# Patient Record
Sex: MALE | Race: WHITE | NOT HISPANIC OR LATINO
[De-identification: names, ages, dates, MRNs, and addresses within clinical notes are randomized per-mention and may not be internally consistent; named-entity substitution may affect disease eponyms.]

---

## 2022-09-08 ENCOUNTER — APPOINTMENT (OUTPATIENT)
Dept: SLEEP CENTER | Facility: HOSPITAL | Age: 32
End: 2022-09-08

## 2022-09-08 ENCOUNTER — OUTPATIENT (OUTPATIENT)
Dept: OUTPATIENT SERVICES | Facility: HOSPITAL | Age: 32
LOS: 1 days | Discharge: HOME | End: 2022-09-08

## 2022-09-08 PROBLEM — Z00.00 ENCOUNTER FOR PREVENTIVE HEALTH EXAMINATION: Status: ACTIVE | Noted: 2022-09-08

## 2022-09-08 PROCEDURE — 95810 POLYSOM 6/> YRS 4/> PARAM: CPT | Mod: 26

## 2022-09-09 DIAGNOSIS — G47.33 OBSTRUCTIVE SLEEP APNEA (ADULT) (PEDIATRIC): ICD-10-CM

## 2022-11-21 ENCOUNTER — APPOINTMENT (OUTPATIENT)
Dept: PULMONOLOGY | Facility: CLINIC | Age: 32
End: 2022-11-21

## 2022-11-21 VITALS
OXYGEN SATURATION: 98 % | DIASTOLIC BLOOD PRESSURE: 70 MMHG | HEIGHT: 72 IN | BODY MASS INDEX: 24.38 KG/M2 | WEIGHT: 180 LBS | HEART RATE: 80 BPM | SYSTOLIC BLOOD PRESSURE: 100 MMHG

## 2022-11-21 DIAGNOSIS — R40.0 SOMNOLENCE: ICD-10-CM

## 2022-11-21 DIAGNOSIS — R53.83 OTHER FATIGUE: ICD-10-CM

## 2022-11-21 PROCEDURE — 99203 OFFICE O/P NEW LOW 30 MIN: CPT

## 2022-11-21 NOTE — ASSESSMENT
[FreeTextEntry1] : Sleep study reviewed. \par No evidence of JULIETTE. \par Recommend CBC, TSH, iron studies. \par If he has persistent fatigue and sleepiness then a repeat sleep study with [facility]. This can be addressed with a sleep specialist.

## 2022-11-21 NOTE — REASON FOR VISIT
[Initial] : an initial visit [Asthma] : asthma [Shortness of Breath] : shortness of breath [Wheezing] : wheezing

## 2022-11-21 NOTE — HISTORY OF PRESENT ILLNESS
[TextBox_4] : 32-year-old healthy man who works for [employer] presenting for follow-up on sleep study.  His apnea-hypopnea index was 4 which was normal.  There is no evidence of sleep apnea on the test.